# Patient Record
Sex: FEMALE | Race: WHITE | NOT HISPANIC OR LATINO | Employment: OTHER | URBAN - METROPOLITAN AREA
[De-identification: names, ages, dates, MRNs, and addresses within clinical notes are randomized per-mention and may not be internally consistent; named-entity substitution may affect disease eponyms.]

---

## 2017-09-05 ENCOUNTER — HOSPITAL ENCOUNTER (OUTPATIENT)
Dept: ULTRASOUND IMAGING | Facility: CLINIC | Age: 62
Discharge: HOME | End: 2017-09-05
Payer: COMMERCIAL

## 2017-09-05 DIAGNOSIS — E04.2 NONTOXIC MULTINODULAR GOITER: ICD-10-CM

## 2017-09-05 PROCEDURE — 76536 US EXAM OF HEAD AND NECK: CPT

## 2017-10-03 ENCOUNTER — TRANSCRIBE ORDERS (OUTPATIENT)
Dept: ADMINISTRATIVE | Facility: HOSPITAL | Age: 62
End: 2017-10-03

## 2017-10-03 DIAGNOSIS — R31.0 GROSS HEMATURIA: Primary | ICD-10-CM

## 2017-10-09 ENCOUNTER — HOSPITAL ENCOUNTER (OUTPATIENT)
Dept: CT IMAGING | Facility: CLINIC | Age: 62
Discharge: HOME | End: 2017-10-09
Payer: COMMERCIAL

## 2017-10-09 DIAGNOSIS — R31.0 GROSS HEMATURIA: ICD-10-CM

## 2017-10-09 PROCEDURE — 74178 CT ABD&PLV WO CNTR FLWD CNTR: CPT

## 2017-10-09 RX ADMIN — IOHEXOL 100 ML: 350 INJECTION, SOLUTION INTRAVENOUS at 10:30

## 2018-01-15 NOTE — RESULT NOTES
PFT Results v2:   Diagnosis/Reason For Study: Asthma   Referring Provider: Dr Raymon Crowell   Spirometry: Forced vital capacity: 3 19L and 101% Predicted Values  Forced expiratory volume in one second: 2 29L and 94% Predicted Value  FEV1/FVC ratio is 92% Predicted Values  F EF 25-75 percent, 1 56 L, 70% predicted    Post Bronchodilator Spirometry: Forced vital capacity : 3 26L and 103% Predicted Values  Forced expiratory volume in one second : 2 36L and 97% Predicted Value  FEV1/FVC ratio is 92% Predicted Values  FEF 25-75 percent, 1 76 L, 79% predicted   Lung Volumes: Total lung capacity : 5 86L and 118% Predicted Values  RV: 134% Predicted Values  RV/T% Predicted Values  DLCO:   DLCO 108% Predicted Values  PFT Interpretation:   Patient had a full lung function testing with spirometry lung volumes and DLCO  Patient gave a good effort  Results meet the ATS standards for acceptability and repeatability  The flow volume curve is normal   There is mild small airway obstructive ventilatory limitation with an appreciable response to the bronco dilator  The lung volumes and DLCO are normal   Findings are consistent with asthma  Clinical correlation is required  Future Appointments    Date/Time Provider Specialty Site   2016 10:45 AM SEAN Potter   Pulmonary Medicine [place] [address] [place]      Electronically signed by : SEAN Mchugh ; 2016  5:11PM EST                       (Author)

## 2018-02-27 ENCOUNTER — TRANSCRIBE ORDERS (OUTPATIENT)
Dept: ADMINISTRATIVE | Facility: HOSPITAL | Age: 63
End: 2018-02-27

## 2018-02-27 DIAGNOSIS — E04.1 THYROID NODULE: Primary | ICD-10-CM

## 2018-03-13 ENCOUNTER — HOSPITAL ENCOUNTER (OUTPATIENT)
Dept: ULTRASOUND IMAGING | Facility: CLINIC | Age: 63
Discharge: HOME | End: 2018-03-13
Payer: COMMERCIAL

## 2018-03-13 DIAGNOSIS — E04.1 THYROID NODULE: ICD-10-CM

## 2018-03-13 PROCEDURE — 76536 US EXAM OF HEAD AND NECK: CPT

## 2019-02-04 ENCOUNTER — TRANSCRIBE ORDERS (OUTPATIENT)
Dept: ADMINISTRATIVE | Facility: HOSPITAL | Age: 64
End: 2019-02-04

## 2019-02-04 DIAGNOSIS — M54.40 ACUTE RIGHT-SIDED LOW BACK PAIN WITH SCIATICA, SCIATICA LATERALITY UNSPECIFIED: Primary | ICD-10-CM

## 2019-02-05 ENCOUNTER — HOSPITAL ENCOUNTER (OUTPATIENT)
Dept: ULTRASOUND IMAGING | Facility: CLINIC | Age: 64
Discharge: HOME | End: 2019-02-05
Payer: MEDICARE

## 2019-02-05 DIAGNOSIS — M54.40 ACUTE RIGHT-SIDED LOW BACK PAIN WITH SCIATICA, SCIATICA LATERALITY UNSPECIFIED: ICD-10-CM

## 2019-02-05 PROCEDURE — 76856 US EXAM PELVIC COMPLETE: CPT

## 2019-02-05 PROCEDURE — 76830 TRANSVAGINAL US NON-OB: CPT

## 2019-04-24 ENCOUNTER — TRANSCRIBE ORDERS (OUTPATIENT)
Dept: ADMINISTRATIVE | Facility: HOSPITAL | Age: 64
End: 2019-04-24

## 2019-04-24 DIAGNOSIS — M81.0 OSTEOPOROSIS, POST-MENOPAUSAL: Primary | ICD-10-CM

## 2019-04-30 ENCOUNTER — HOSPITAL ENCOUNTER (OUTPATIENT)
Dept: MAMMOGRAPHY | Facility: CLINIC | Age: 64
Discharge: HOME | End: 2019-04-30
Payer: MEDICARE

## 2019-04-30 DIAGNOSIS — M81.0 OSTEOPOROSIS, POST-MENOPAUSAL: ICD-10-CM

## 2019-04-30 PROCEDURE — 77080 DXA BONE DENSITY AXIAL: CPT

## 2019-05-24 ENCOUNTER — TRANSCRIBE ORDERS (OUTPATIENT)
Dept: ADMINISTRATIVE | Facility: HOSPITAL | Age: 64
End: 2019-05-24

## 2019-05-24 DIAGNOSIS — R93.5 ABNORMAL US (ULTRASOUND) OF ABDOMEN: Primary | ICD-10-CM

## 2019-05-30 ENCOUNTER — HOSPITAL ENCOUNTER (OUTPATIENT)
Dept: ULTRASOUND IMAGING | Facility: CLINIC | Age: 64
Discharge: HOME | End: 2019-05-30
Payer: MEDICARE

## 2019-05-30 DIAGNOSIS — R93.5 ABNORMAL US (ULTRASOUND) OF ABDOMEN: ICD-10-CM

## 2019-05-30 PROCEDURE — 76856 US EXAM PELVIC COMPLETE: CPT

## 2019-05-30 PROCEDURE — 76830 TRANSVAGINAL US NON-OB: CPT

## 2019-09-12 ENCOUNTER — TRANSCRIBE ORDERS (OUTPATIENT)
Dept: ADMINISTRATIVE | Facility: HOSPITAL | Age: 64
End: 2019-09-12

## 2019-09-12 DIAGNOSIS — E04.2 MULTIPLE THYROID NODULES: Primary | ICD-10-CM

## 2019-09-24 ENCOUNTER — HOSPITAL ENCOUNTER (OUTPATIENT)
Dept: ULTRASOUND IMAGING | Facility: CLINIC | Age: 64
Discharge: HOME | End: 2019-09-24
Payer: MEDICARE

## 2019-09-24 DIAGNOSIS — E04.2 MULTIPLE THYROID NODULES: ICD-10-CM

## 2019-09-24 PROCEDURE — 76536 US EXAM OF HEAD AND NECK: CPT

## 2021-03-22 ENCOUNTER — TRANSCRIBE ORDERS (OUTPATIENT)
Dept: ADMINISTRATIVE | Facility: HOSPITAL | Age: 66
End: 2021-03-22

## 2021-03-22 DIAGNOSIS — R06.02 SHORTNESS OF BREATH: Primary | ICD-10-CM

## 2021-03-23 ENCOUNTER — APPOINTMENT (OUTPATIENT)
Dept: RADIOLOGY | Facility: CLINIC | Age: 66
End: 2021-03-23
Payer: MEDICARE

## 2021-03-23 DIAGNOSIS — R06.02 SHORTNESS OF BREATH: ICD-10-CM

## 2021-03-23 PROCEDURE — 71046 X-RAY EXAM CHEST 2 VIEWS: CPT

## 2021-04-08 ENCOUNTER — TRANSCRIBE ORDERS (OUTPATIENT)
Dept: ADMINISTRATIVE | Facility: HOSPITAL | Age: 66
End: 2021-04-08

## 2021-04-08 DIAGNOSIS — J45.41 MODERATE PERSISTENT ASTHMA WITH (ACUTE) EXACERBATION: Primary | ICD-10-CM

## 2021-05-10 ENCOUNTER — HOSPITAL ENCOUNTER (OUTPATIENT)
Dept: PULMONOLOGY | Facility: HOSPITAL | Age: 66
Discharge: HOME | End: 2021-05-10
Payer: MEDICARE

## 2021-05-10 DIAGNOSIS — J45.41 MODERATE PERSISTENT ASTHMA WITH (ACUTE) EXACERBATION: ICD-10-CM

## 2021-05-10 PROCEDURE — 94060 EVALUATION OF WHEEZING: CPT

## 2021-05-10 PROCEDURE — 94760 N-INVAS EAR/PLS OXIMETRY 1: CPT

## 2021-05-10 PROCEDURE — 94060 EVALUATION OF WHEEZING: CPT | Performed by: INTERNAL MEDICINE

## 2021-05-10 RX ORDER — ALBUTEROL SULFATE 2.5 MG/3ML
2.5 SOLUTION RESPIRATORY (INHALATION) ONCE
Status: COMPLETED | OUTPATIENT
Start: 2021-05-10 | End: 2021-05-10

## 2021-05-10 RX ADMIN — ALBUTEROL SULFATE 2.5 MG: 2.5 SOLUTION RESPIRATORY (INHALATION) at 12:17

## 2021-10-20 ENCOUNTER — HOSPITAL ENCOUNTER (OUTPATIENT)
Dept: ULTRASOUND IMAGING | Facility: CLINIC | Age: 66
Discharge: HOME | End: 2021-10-20
Payer: MEDICARE

## 2021-10-20 DIAGNOSIS — E04.2 NONTOXIC MULTINODULAR GOITER: ICD-10-CM

## 2021-10-20 PROCEDURE — 76536 US EXAM OF HEAD AND NECK: CPT

## 2022-01-06 ENCOUNTER — OFFICE VISIT (OUTPATIENT)
Dept: CARDIOLOGY CLINIC | Facility: CLINIC | Age: 67
End: 2022-01-06
Payer: MEDICARE

## 2022-01-06 VITALS
OXYGEN SATURATION: 97 % | BODY MASS INDEX: 25.52 KG/M2 | HEIGHT: 63 IN | SYSTOLIC BLOOD PRESSURE: 130 MMHG | WEIGHT: 144 LBS | RESPIRATION RATE: 16 BRPM | HEART RATE: 83 BPM | DIASTOLIC BLOOD PRESSURE: 70 MMHG

## 2022-01-06 DIAGNOSIS — R00.2 INTERMITTENT PALPITATIONS: ICD-10-CM

## 2022-01-06 DIAGNOSIS — I10 ESSENTIAL HYPERTENSION: ICD-10-CM

## 2022-01-06 DIAGNOSIS — R06.02 SHORTNESS OF BREATH ON EXERTION: ICD-10-CM

## 2022-01-06 DIAGNOSIS — I51.7 LVH (LEFT VENTRICULAR HYPERTROPHY): ICD-10-CM

## 2022-01-06 DIAGNOSIS — E78.2 MIXED HYPERLIPIDEMIA: ICD-10-CM

## 2022-01-06 DIAGNOSIS — R07.9 CHEST PAIN, UNSPECIFIED TYPE: Primary | ICD-10-CM

## 2022-01-06 DIAGNOSIS — I51.89 DIASTOLIC DYSFUNCTION: ICD-10-CM

## 2022-01-06 PROCEDURE — 93000 ELECTROCARDIOGRAM COMPLETE: CPT | Performed by: INTERNAL MEDICINE

## 2022-01-06 PROCEDURE — 99204 OFFICE O/P NEW MOD 45 MIN: CPT | Performed by: INTERNAL MEDICINE

## 2022-01-06 RX ORDER — ATORVASTATIN CALCIUM 10 MG/1
TABLET, FILM COATED ORAL
COMMUNITY
Start: 2021-10-30

## 2022-01-06 RX ORDER — LISINOPRIL 10 MG/1
TABLET ORAL
COMMUNITY
End: 2022-02-17 | Stop reason: SDUPTHER

## 2022-01-06 RX ORDER — TIOTROPIUM BROMIDE INHALATION SPRAY 1.56 UG/1
SPRAY, METERED RESPIRATORY (INHALATION)
COMMUNITY
Start: 2021-12-21

## 2022-01-06 RX ORDER — FEXOFENADINE HCL 180 MG/1
180 TABLET ORAL DAILY
COMMUNITY

## 2022-01-06 RX ORDER — SUMATRIPTAN 50 MG/1
TABLET, FILM COATED ORAL
COMMUNITY

## 2022-01-06 RX ORDER — ALBUTEROL SULFATE 90 UG/1
2 AEROSOL, METERED RESPIRATORY (INHALATION) EVERY 4 HOURS PRN
COMMUNITY
Start: 2021-05-18 | End: 2022-05-18

## 2022-01-06 RX ORDER — PAROXETINE HYDROCHLORIDE 20 MG/1
TABLET, FILM COATED ORAL
COMMUNITY

## 2022-01-06 NOTE — PROGRESS NOTES
[facility]  [address], [address] [place] [address], [facility] Yessica Gomez  Tel: [phone]      NAME: Ursula Tellez  AGE: 78 y o  SEX: female  : 1955   MRN: [id number]      Chief Complaint:  Chief Complaint   Patient presents with   Ardeth Needs Establish Care     consult         History of Present Illness:   48-year-old female with HTN, HLP, asthma/chronic bronchitis who is referred for palpitations, chest pain, shortness of breath  Patient states she has been having palpitations since 2021  Her heart randomly starts going fast usually in the low 100s,  Unrelated to whether she is resting or doing something  No associated chest pain, lightheadedness, shortness of breath  Patient has also been having episodes of mid chest pain at random times  Mild-to-moderate in intensity  No radiation  No significant associated symptoms  She also feels SOB with activity  She does see a pulmonologist for her asthma/chronic bronchitis    Pt denies lightheadedness, syncope, swelling feet, orthopnea, PND, claudication  HTN, LVH, DD -  Has been hypertensive for many years  Taking medications regularly  Denies lightheadedness, headache, medication side effects  HLP -  Has had hyperlipidemia for many years  Taking statin regularly along with diet control  Denies myalgia  PCP closely monitoring the blood work      Past Medical History:  Past Medical History:   Diagnosis Date    Asthma        Family History:  Family History   Problem Relation Age of Onset    Hypertension Mother          Social History:  Social History     Socioeconomic History    Marital status: /Civil Union     Spouse name: None    Number of children: None    Years of education: None    Highest education level: None   Occupational History    None   Tobacco Use    Smoking status: Never Smoker    Smokeless tobacco: Never Used   Vaping Use    Vaping Use: Never used   Substance and Sexual Activity    Alcohol use: Never    Drug use: Never    Sexual activity: None   Other Topics Concern    None   Social History Narrative    None     Social Determinants of Health     Financial Resource Strain: Not on file   Food Insecurity: Not on file   Transportation Needs: Not on file   Physical Activity: Not on file   Stress: Not on file   Social Connections: Not on file   Intimate Partner Violence: Not on file   Housing Stability: Not on file         Active Problems:  Patient Active Problem List   Diagnosis    Moderate persistent asthma with (acute) exacerbation         The following portions of the patient's history were reviewed and updated as appropriate: past medical history, past surgical history, past family history,  past social history, current medications, allergies and problem list       Review of Systems:  Ten system review of systems was essentially negative other than what is mentioned in the HPI above    Vitals:  Vitals:    01/06/22 0911   BP: 130/70   Pulse: 83   Resp: 16   SpO2: 97%       Body mass index is 25 51 kg/m²  Weight (last 2 days)     Date/Time Weight    01/06/22 0911 65 3 (144)            Physical Examination:  General: Patient is not in acute distress  Awake, alert, oriented in time, place and person  Responding to commands  Head: Normocephalic  Atraumatic  Eyes: Both pupils normal sized, round and reactive to light  Nonicteric  ENT: Normal external ear canals  Neck: Supple  JVP not raised  Trachea central  No thyromegaly  Lungs: Bilateral bronchovascular breath sounds with no crackles or rhonchi  Chest wall: No tenderness  Cardiovascular: RRR  S1 and S2 normal  No murmur, rub or gallop  Gastrointestinal: Abdomen soft, nontender  No guarding or rigidity  Liver and spleen not palpable  Bowel sounds present  Neurologic: Patient is awake, alert, oriented in time, place and person  Responding to commands   Moving all extremities  Integumentary:  No skin rash  Lymphatic: No cervical lymphadenopathy  Back: Symmetric  No CVA tenderness  Extremities: No clubbing, cyanosis or edema      Laboratory Results:    EKG:  Reviewed by me   01/06/2022  Normal sinus rhythm  LAD      Medications:    Current Outpatient Medications:     albuterol (Ventolin HFA) 90 mcg/act inhaler, Inhale 2 puffs every 4 (four) hours as needed, Disp: , Rfl:     atorvastatin (LIPITOR) 10 mg tablet, , Disp: , Rfl:     Cholecalciferol 25 MCG (1000 UT) tablet, Take 1,000 Units by mouth daily, Disp: , Rfl:     fexofenadine (ALLEGRA) 180 MG tablet, Take 180 mg by mouth daily, Disp: , Rfl:     fluticasone-vilanterol (Breo Ellipta) 200-25 MCG/INH inhaler, Breo Ellipta 200 mcg-25 mcg/dose powder for inhalation, Disp: , Rfl:     lisinopril (ZESTRIL) 10 mg tablet, lisinopril 10 mg tablet, Disp: , Rfl:     Multiple Vitamins-Minerals (CENTRUM SILVER 50+MEN PO), Take 1 tablet by mouth daily, Disp: , Rfl:     PARoxetine (PAXIL) 20 mg tablet, paroxetine 20 mg tablet, Disp: , Rfl:     Spiriva Respimat 1 25 MCG/ACT AERS inhaler, , Disp: , Rfl:     SUMAtriptan (IMITREX) 50 mg tablet, sumatriptan 50 mg tablet, Disp: , Rfl:       Allergies: Allergies   Allergen Reactions    Albumen, Egg - Food Allergy Shortness Of Breath    Penicillins Rash     Other reaction(s): Asthma      Tetanus Antitoxin Anaphylaxis         Assessment and Plan:  1  Chest pain, unspecified type  2  Shortness of breath on exertion  Patient had a regular exercise stress test which showed a 0 5 mm upsloping ST depression  She could only exercise for approx 2 1/2 min  Ordering pharmacological nuclear stress test for further evaluation  - NM myocardial perfusion spect (rx stress and/or rest); Future    3  Intermittent palpitations  EKG done to the reviewed with the patient  Event monitor ordered for further evaluation    Patient states she does not drink coffee, tea, soda, alcohol  - AMB extended holter monitor; Future    4  Essential hypertension, LVH, Diastolic dysfunction  BP stable  Continue current medications  Continue to monitor BP at home and call if abnormal    5  Mixed hyperlipidemia  Continue statin and diet control  Her PCP closely monitor the blood work    Recommend aggressive risk factor modification and therapeutic lifestyle changes  Low-salt, low-calorie, low-fat, low-cholesterol diet with regular exercise and to optimize weight  I will defer the ordering and monitoring of necessity lab studies to you, but I am available and happy to review and manage any of the data at your request in the future  Discussed concepts of atherosclerosis, including signs and symptoms of cardiac disease  Previous studies were reviewed  Safety measures were reviewed  Questions were entertained and answered  Patient was advised to report any problems requiring medical attention  Follow-up with PCP and appropriate specialist and lab work as discussed  Return for follow up visit as scheduled or earlier, if needed  Thank you for allowing me to participate in the care and evaluation of your patient  Should you have any questions, please feel free to contact me        Lily Logan MD  1/6/2022,9:52 AM

## 2022-01-24 ENCOUNTER — HOSPITAL ENCOUNTER (OUTPATIENT)
Dept: NON INVASIVE DIAGNOSTICS | Facility: CLINIC | Age: 67
Discharge: HOME | End: 2022-01-24
Payer: MEDICARE

## 2022-01-24 VITALS
SYSTOLIC BLOOD PRESSURE: 144 MMHG | DIASTOLIC BLOOD PRESSURE: 102 MMHG | OXYGEN SATURATION: 100 % | HEART RATE: 69 BPM | BODY MASS INDEX: 25.52 KG/M2 | WEIGHT: 144 LBS | HEIGHT: 63 IN

## 2022-01-24 DIAGNOSIS — R06.02 SHORTNESS OF BREATH ON EXERTION: ICD-10-CM

## 2022-01-24 DIAGNOSIS — R07.9 CHEST PAIN, UNSPECIFIED TYPE: ICD-10-CM

## 2022-01-24 LAB
NUC STRESS DIASTOLIC VOLUME INDEX: 36 ML/M2
NUC STRESS EJECTION FRACTION: 65 %
NUC STRESS SYSTOLIC VOLUME INDEX: 13 ML/M2
RATE PRESSURE PRODUCT: NORMAL
SL CV REST NUCLEAR ISOTOPE DOSE: 10.62 MCI
SL CV STRESS NUCLEAR ISOTOPE DOSE: 32.3 MCI
SL CV STRESS RECOVERY BP: NORMAL MMHG
SL CV STRESS RECOVERY HR: 78 BPM
STRESS ANGINA INDEX: 0
STRESS BASELINE BP: NORMAL MMHG
STRESS BASELINE HR: 69 BPM
STRESS O2 SAT REST: 100 %
STRESS PEAK HR: 97 BPM
STRESS POST O2 SAT PEAK: 100 %
STRESS POST PEAK BP: 158 MMHG
STRESS/REST PERFUSION RATIO: 1.07

## 2022-01-24 PROCEDURE — 78452 HT MUSCLE IMAGE SPECT MULT: CPT | Performed by: INTERNAL MEDICINE

## 2022-01-24 PROCEDURE — G1004 CDSM NDSC: HCPCS

## 2022-01-24 PROCEDURE — 93017 CV STRESS TEST TRACING ONLY: CPT

## 2022-01-24 PROCEDURE — 93016 CV STRESS TEST SUPVJ ONLY: CPT | Performed by: INTERNAL MEDICINE

## 2022-01-24 PROCEDURE — A9502 TC99M TETROFOSMIN: HCPCS

## 2022-01-24 PROCEDURE — 78452 HT MUSCLE IMAGE SPECT MULT: CPT

## 2022-01-24 PROCEDURE — 93018 CV STRESS TEST I&R ONLY: CPT | Performed by: INTERNAL MEDICINE

## 2022-01-24 RX ADMIN — REGADENOSON 0.4 MG: 0.08 INJECTION, SOLUTION INTRAVENOUS at 13:02

## 2022-01-25 ENCOUNTER — CLINICAL SUPPORT (OUTPATIENT)
Dept: CARDIOLOGY CLINIC | Facility: CLINIC | Age: 67
End: 2022-01-25
Payer: MEDICARE

## 2022-01-25 DIAGNOSIS — R00.2 INTERMITTENT PALPITATIONS: ICD-10-CM

## 2022-01-25 PROCEDURE — 93248 EXT ECG>7D<15D REV&INTERPJ: CPT | Performed by: INTERNAL MEDICINE

## 2022-01-28 LAB
MAX DIASTOLIC BP: 94 MMHG
MAX HEART RATE: 97 BPM
MAX PREDICTED HEART RATE: 153 BPM
MAX. SYSTOLIC BP: 158 MMHG
PROTOCOL NAME: NORMAL
REASON FOR TERMINATION: NORMAL
TARGET HR FORMULA: NORMAL
TIME IN EXERCISE PHASE: NORMAL

## 2022-02-17 ENCOUNTER — OFFICE VISIT (OUTPATIENT)
Dept: CARDIOLOGY CLINIC | Facility: CLINIC | Age: 67
End: 2022-02-17
Payer: MEDICARE

## 2022-02-17 VITALS
DIASTOLIC BLOOD PRESSURE: 98 MMHG | RESPIRATION RATE: 16 BRPM | WEIGHT: 140 LBS | HEIGHT: 63 IN | BODY MASS INDEX: 24.8 KG/M2 | SYSTOLIC BLOOD PRESSURE: 142 MMHG | OXYGEN SATURATION: 97 % | HEART RATE: 81 BPM

## 2022-02-17 DIAGNOSIS — I10 ESSENTIAL HYPERTENSION: ICD-10-CM

## 2022-02-17 DIAGNOSIS — I51.7 LVH (LEFT VENTRICULAR HYPERTROPHY): ICD-10-CM

## 2022-02-17 DIAGNOSIS — I51.89 DIASTOLIC DYSFUNCTION: ICD-10-CM

## 2022-02-17 DIAGNOSIS — E78.2 MIXED HYPERLIPIDEMIA: ICD-10-CM

## 2022-02-17 DIAGNOSIS — R00.2 INTERMITTENT PALPITATIONS: Primary | ICD-10-CM

## 2022-02-17 PROCEDURE — 99214 OFFICE O/P EST MOD 30 MIN: CPT | Performed by: INTERNAL MEDICINE

## 2022-02-17 RX ORDER — METOPROLOL SUCCINATE 25 MG/1
25 TABLET, EXTENDED RELEASE ORAL DAILY
Qty: 90 TABLET | Refills: 2 | Status: SHIPPED | OUTPATIENT
Start: 2022-02-17 | End: 2022-08-08 | Stop reason: ALTCHOICE

## 2022-02-17 RX ORDER — LISINOPRIL 5 MG/1
5 TABLET ORAL DAILY
Qty: 90 TABLET | Refills: 3 | Status: SHIPPED | OUTPATIENT
Start: 2022-02-17 | End: 2022-08-08 | Stop reason: SDUPTHER

## 2022-02-17 NOTE — PROGRESS NOTES
CARDIOLOGY OFFICE VISIT  [facility]  [address] [facility], [address] [facility], [facility] Yessica Gomez  Tel: [phone]      NAME: Felton Room  AGE: 78 y o  SEX: female  : 1955   MRN: [id number]      Chief Complaint:  Chief Complaint   Patient presents with    Follow-up         History of Present Illness:   Patient comes for follow up  States says she still gets occasional palpitations - sometimes while she is working like vacuuming or cooking and sometimes while she is doing nothing  Pt denies chest pain / pressure, SOB, lightheadedness, syncope, swelling feet, orthopnea, PND, claudication  Patient states she has been having palpitations since 2021  Her heart randomly starts going fast usually in the low 100s,  Unrelated to whether she is resting or doing something  No associated chest pain, lightheadedness, shortness of breath  HTN, LVH, DD -  Has been hypertensive for many years  Taking medications regularly  Denies lightheadedness, headache, medication side effects  HLP -  Has had hyperlipidemia for many years  Taking statin regularly along with diet control  Denies myalgia  PCP closely monitoring the blood work      Past Medical History:  Past Medical History:   Diagnosis Date    Asthma          Family History:  Family History   Problem Relation Age of Onset    Hypertension Mother          Social History:  Social History     Socioeconomic History    Marital status: /Civil Union     Spouse name: None    Number of children: None    Years of education: None    Highest education level: None   Occupational History    None   Tobacco Use    Smoking status: Never Smoker    Smokeless tobacco: Never Used   Vaping Use    Vaping Use: Never used   Substance and Sexual Activity    Alcohol use: Never    Drug use: Never    Sexual activity: None   Other Topics Concern    None   Social History Narrative    None Social Determinants of Health     Financial Resource Strain: Not on file   Food Insecurity: Not on file   Transportation Needs: Not on file   Physical Activity: Not on file   Stress: Not on file   Social Connections: Not on file   Intimate Partner Violence: Not on file   Housing Stability: Not on file         Active Problems:  Patient Active Problem List   Diagnosis    Moderate persistent asthma with (acute) exacerbation         The following portions of the patient's history were reviewed and updated as appropriate: past medical history, past surgical history, past family history,  past social history, current medications, allergies and problem list       Review of Systems:  Constitutional: Denies fever, chills  Eyes: Denies eye redness, eye discharge  ENT: Denies hearing loss, sneezing, nasal discharge, sore throat   Respiratory: Denies cough, expectoration, shortness of breath  Cardiovascular: Denies lower extremity swelling  Gastrointestinal: Denies abdominal pain, nausea, vomiting, diarrhea  Genito-Urinary: Denies dysuria, incontinence  Musculoskeletal: Denies back pain, joint pain, muscle pain  Neurologic: Denies lightheadedness, syncope, headache, seizures  Endocrine: Denies polydipsia, temperature intolerance  Allergy and Immunology: Denies hives, insect bite sensitivity  Hematological and Lymphatic: Denies bleeding problems, swollen glands   Psychological: Denies depression, suicidal ideation, anxiety, panic  Dermatological: Denies pruritus, rash, skin lesion changes      Vitals:  Vitals:    02/17/22 0949   BP: 142/98   Pulse: 81   Resp: 16   SpO2: 97%       Body mass index is 24 8 kg/m²  Weight (last 2 days)     Date/Time Weight    02/17/22 0949 63 5 (140)            Physical Examination:  General: Patient is not in acute distress  Awake, alert, oriented in time, place and person  Responding to commands  Head: Normocephalic  Atraumatic  Eyes: Both pupils normal sized, round and reactive to light  Nonicteric  ENT: Normal external ear canals  Neck: Supple  JVP not raised  Trachea central  No thyromegaly  Lungs: Bilateral bronchovascular breath sounds with no crackles or rhonchi  Chest wall: No tenderness  Cardiovascular: RRR  S1 and S2 normal  No murmur, rub or gallop  Gastrointestinal: Abdomen soft, nontender  No guarding or rigidity  Liver and spleen not palpable  Bowel sounds present  Neurologic: Patient is awake, alert, oriented in time, place and person  Responding to commands  Moving all extremities  Integumentary:  No skin rash  Lymphatic: No cervical lymphadenopathy  Back: Symmetric  No CVA tenderness  Extremities: No clubbing, cyanosis or edema      Medications:    Current Outpatient Medications:     albuterol (Ventolin HFA) 90 mcg/act inhaler, Inhale 2 puffs every 4 (four) hours as needed, Disp: , Rfl:     atorvastatin (LIPITOR) 10 mg tablet, , Disp: , Rfl:     Cholecalciferol 25 MCG (1000 UT) tablet, Take 1,000 Units by mouth daily, Disp: , Rfl:     fexofenadine (ALLEGRA) 180 MG tablet, Take 180 mg by mouth daily, Disp: , Rfl:     fluticasone-vilanterol (Breo Ellipta) 200-25 MCG/INH inhaler, Breo Ellipta 200 mcg-25 mcg/dose powder for inhalation, Disp: , Rfl:     lisinopril (ZESTRIL) 5 mg tablet, Take 1 tablet (5 mg total) by mouth daily, Disp: 90 tablet, Rfl: 3    Multiple Vitamins-Minerals (CENTRUM SILVER 50+MEN PO), Take 1 tablet by mouth daily, Disp: , Rfl:     PARoxetine (PAXIL) 20 mg tablet, paroxetine 20 mg tablet, Disp: , Rfl:     Spiriva Respimat 1 25 MCG/ACT AERS inhaler, , Disp: , Rfl:     SUMAtriptan (IMITREX) 50 mg tablet, sumatriptan 50 mg tablet, Disp: , Rfl:     metoprolol succinate (TOPROL-XL) 25 mg 24 hr tablet, Take 1 tablet (25 mg total) by mouth daily, Disp: 90 tablet, Rfl: 2      Allergies:   Allergies   Allergen Reactions    Albumen, Egg - Food Allergy Shortness Of Breath    Penicillins Rash     Other reaction(s): Asthma      Tetanus Antitoxin Anaphylaxis Assessment and Plan:  1  Intermittent palpitations  Stress test and event monitor findings discussed with the patient in detail  Patient started on metoprolol succinate 25 mg daily  States she does not drink coffee, tea, soda, alcohol, stimulants  - metoprolol succinate (TOPROL-XL) 25 mg 24 hr tablet; Take 1 tablet (25 mg total) by mouth daily  Dispense: 90 tablet; Refill: 2    2  Essential hypertension, LVH, DD  Lisinopril decreased from 10 mg to 5 mg daily  Metoprolol succinate 25 mg daily added  - lisinopril (ZESTRIL) 5 mg tablet; Take 1 tablet (5 mg total) by mouth daily  Dispense: 90 tablet; Refill: 3  - metoprolol succinate (TOPROL-XL) 25 mg 24 hr tablet; Take 1 tablet (25 mg total) by mouth daily  Dispense: 90 tablet; Refill: 2    3  Mixed hyperlipidemia  Continue statin and diet control  Her PCP closely monitor the blood work    Recommend aggressive risk factor modification and therapeutic lifestyle changes  Low-salt, low-calorie, low-fat, low-cholesterol diet with regular exercise and to optimize weight  I will defer the ordering and monitoring of necessity lab studies to you, but I am available and happy to review and manage any of the data at your request in the future  Discussed concepts of atherosclerosis, including signs and symptoms of cardiac disease  Previous studies were reviewed  Safety measures were reviewed  Questions were entertained and answered  Patient was advised to report any problems requiring medical attention  Follow-up with PCP and appropriate specialist and lab work as discussed  Return for follow up visit as scheduled or earlier, if needed  Thank you for allowing me to participate in the care and evaluation of your patient  Should you have any questions, please feel free to contact me        Herb Cherry MD  2/17/2022,10:19 AM

## 2022-03-21 ENCOUNTER — TELEPHONE (OUTPATIENT)
Dept: CARDIOLOGY CLINIC | Facility: CLINIC | Age: 67
End: 2022-03-21

## 2022-03-21 NOTE — TELEPHONE ENCOUNTER
----- Message from [facility] sent at 3/20/2022 12:56 PM EDT -----  Regarding: Metoprolol medicine intolerance  Dear Dr Emerita King,   Please note that I have problems taking Metoprolol medicine  When I started taking a 25 mg  tablet, I got light asthma attack  After waiting for 3 days, I started taking 1/3 of a 25 mg  tablet  While my pulse rate diminished almost to normal (77), I have had shortness of breath, frequent cough and wheezing in my chest  It seems that this medicine is not much compatible with my bronchial asthma and chronic bronchitis  Is it possible to prescribe another medicine that while reducing my high heartbeat and pulse rate could not aggravate my asthma and bronchitis? Thanks for your urgent attention to this matter  Best Regards   [facility]

## 2022-08-08 ENCOUNTER — OFFICE VISIT (OUTPATIENT)
Dept: CARDIOLOGY CLINIC | Facility: CLINIC | Age: 67
End: 2022-08-08
Payer: MEDICARE

## 2022-08-08 VITALS
RESPIRATION RATE: 16 BRPM | HEART RATE: 84 BPM | SYSTOLIC BLOOD PRESSURE: 148 MMHG | DIASTOLIC BLOOD PRESSURE: 80 MMHG | BODY MASS INDEX: 24.27 KG/M2 | WEIGHT: 137 LBS | HEIGHT: 63 IN | OXYGEN SATURATION: 99 %

## 2022-08-08 DIAGNOSIS — E78.2 MIXED HYPERLIPIDEMIA: ICD-10-CM

## 2022-08-08 DIAGNOSIS — R00.2 INTERMITTENT PALPITATIONS: Primary | ICD-10-CM

## 2022-08-08 DIAGNOSIS — I10 ESSENTIAL HYPERTENSION: ICD-10-CM

## 2022-08-08 DIAGNOSIS — I51.7 LVH (LEFT VENTRICULAR HYPERTROPHY): ICD-10-CM

## 2022-08-08 DIAGNOSIS — I51.89 DIASTOLIC DYSFUNCTION: ICD-10-CM

## 2022-08-08 PROCEDURE — 99214 OFFICE O/P EST MOD 30 MIN: CPT | Performed by: INTERNAL MEDICINE

## 2022-08-08 RX ORDER — LISINOPRIL 10 MG/1
15 TABLET ORAL DAILY
Qty: 135 TABLET | Refills: 2 | Status: SHIPPED | OUTPATIENT
Start: 2022-08-08 | End: 2022-08-08

## 2022-08-08 RX ORDER — LISINOPRIL 10 MG/1
TABLET ORAL
Qty: 135 TABLET | Refills: 2 | Status: SHIPPED | OUTPATIENT
Start: 2022-08-08 | End: 2022-08-09

## 2022-08-08 NOTE — TELEPHONE ENCOUNTER
Name from pharmacy: LISINOPRIL 10 MG TABLET          Will file in chart as: lisinopril (ZESTRIL) 10 mg tablet    Sig: TAKE 1 AND 1/2 TABLETS DAILY BY MOUTH    Disp:  135 tablet    Refills:  2    Start: 8/8/2022    Class: Normal    For: Essential hypertension    Last ordered: Today by Mayte Dean MD Last refill: 8/8/2022    Rx #: [id number]    Pharmacy comment: Alternative Requested:PT'S INSURANCE WILL ONLY PAY FOR 1 TAB DAILY  Powered by [employer] Cwzuvqmk21/08/2022 11:51 AM  This is a duplicate request of medication ordered 2022-08-08  Check to see if the patient is requesting a refill from a new pharmacy  Cardiovascular:  ACE Inhibitors Failed 08/08/2022 11:51 AM   Protocol Details  Cr in normal range and within 360 days    K in normal range and within 360 days    BP completed in the last 6 months    Valid encounter within last 6 months    Pregnancy status within 30 days      This request has changes from the previous prescription  To be filled at: [id number] [place]ASTA GARCIA - [address]   Please review and refill if possible  Thanks!

## 2022-08-08 NOTE — PROGRESS NOTES
CARDIOLOGY OFFICE VISIT  [facility]  [address], [facility] Yessica Gomez  Tel: [phone]      NAME: Kj Mckeon  AGE: 75 y o  SEX: female  : 1955  MRN: [id number]      Chief Complaint:  Chief Complaint   Patient presents with    Follow-up         History of Present Illness:   Patient comes for follow up  States she is doing well from cardiac stand point and denies chest pain / pressure, SOB, lightheadedness, syncope, swelling feet, orthopnea, PND, claudication  Patient continues to get palpitations in the form of extra/missed beats  Infrequently  She did not tolerate metoprolol as it worsened her asthma  So she discontinued it  Denies associated chest pain, lightheaded, shortness of    Essential hypertension, LVH, DD -  Has been hypertensive for many years  Taking medications regularly  Denies lightheadedness, headache, medication side effects  Mixed hyperlipidemia -  Has had hyperlipidemia for many years  Taking statin regularly along with diet control  Denies myalgia  PCP closely monitoring the blood work  Past Medical History:  Past Medical History:   Diagnosis Date    Asthma          Past Surgical History:  History reviewed  No pertinent surgical history        Family History:  Family History   Problem Relation Age of Onset    Hypertension Mother          Social History:  Social History     Socioeconomic History    Marital status: /Civil Union     Spouse name: None    Number of children: None    Years of education: None    Highest education level: None   Occupational History    None   Tobacco Use    Smoking status: Never Smoker    Smokeless tobacco: Never Used   Vaping Use    Vaping Use: Never used   Substance and Sexual Activity    Alcohol use: Never    Drug use: Never    Sexual activity: None   Other Topics Concern    None   Social History Narrative    None     Social Determinants of Health     Financial Resource Strain: Not on file   Food Insecurity: Not on file   Transportation Needs: Not on file   Physical Activity: Not on file   Stress: Not on file   Social Connections: Not on file   Intimate Partner Violence: Not on file   Housing Stability: Not on file         Active Problems:  Patient Active Problem List   Diagnosis    Moderate persistent asthma with (acute) exacerbation         The following portions of the patient's history were reviewed and updated as appropriate: past medical history, past surgical history, past family history,  past social history, current medications, allergies and problem list       Review of Systems:  Constitutional: Denies fever, chills  Eyes: Denies eye redness, eye discharge  ENT: Denies hearing loss, sneezing, nasal discharge, sore throat   Respiratory: Denies cough, expectoration, shortness of breath  Cardiovascular: Denies chest pain, lower extremity swelling  Gastrointestinal: Denies abdominal pain, nausea, vomiting, diarrhea  Genito-Urinary: Denies dysuria, incontinence  Musculoskeletal: Denies back pain, joint pain, muscle pain  Neurologic: Denies lightheadedness, syncope, headache, seizures  Endocrine: Denies polydipsia, temperature intolerance  Allergy and Immunology: Denies hives, insect bite sensitivity  Hematological and Lymphatic: Denies bleeding problems, swollen glands   Psychological: Denies depression, suicidal ideation, anxiety, panic  Dermatological: Denies pruritus, rash, skin lesion changes      Vitals:  Vitals:    08/08/22 1109   BP: 148/80   Pulse: 84   Resp: 16   SpO2: 99%       Body mass index is 24 27 kg/m²  Weight (last 2 days)     Date/Time Weight    08/08/22 1109 62 1 (137)            Physical Examination:  General: Patient is not in acute distress  Awake, alert, oriented in time, place and person  Responding to commands  Head: Normocephalic  Atraumatic  Eyes: Both pupils normal sized, round and reactive to light  Nonicteric  ENT: Normal external ear canals  Neck: Supple  JVP not raised  Trachea central  No thyromegaly  Lungs: Bilateral bronchovascular breath sounds with no crackles or rhonchi  Chest wall: No tenderness  Cardiovascular: RRR  S1 and S2 normal  No murmur, rub or gallop  Gastrointestinal: Abdomen soft, nontender  No guarding or rigidity  Liver and spleen not palpable  Bowel sounds present  Neurologic: Patient is awake, alert, oriented in time, place and person  Responding to commands  Moving all extremities  Integumentary:  No skin rash  Lymphatic: No cervical lymphadenopathy  Back: Symmetric  No CVA tenderness  Extremities: No clubbing, cyanosis or edema      Results for orders placed during the hospital encounter of 01/24/22    NM myocardial perfusion spect (rx stress and/or rest)    Interpretation Summary    Stress ECG: The stress ECG is nondiagnostic for ischemia after pharmacologic stress    Perfusion: There is a left ventricular perfusion defect that is small in size present in the inferoseptal location(s) that is paradoxical     Stress Function: Left ventricular function post-stress is normal  Post-stress ejection fraction is 65 %  Normal study after pharmacologic vasodilation  There was image artifact without diagnostic evidence of ischemia  (small paradoxical basal inferoseptal defect)  Left ventricular function was preserved        Medications:    Current Outpatient Medications:     atorvastatin (LIPITOR) 10 mg tablet, , Disp: , Rfl:     Cholecalciferol 25 MCG (1000 UT) tablet, Take 1,000 Units by mouth daily, Disp: , Rfl:     fexofenadine (ALLEGRA) 180 MG tablet, Take 180 mg by mouth daily, Disp: , Rfl:     fluticasone-vilanterol (Breo Ellipta) 200-25 MCG/INH inhaler, Breo Ellipta 200 mcg-25 mcg/dose powder for inhalation, Disp: , Rfl:     lisinopril (ZESTRIL) 5 mg tablet, Take 1 tablet (5 mg total) by mouth daily (Patient taking differently: Take 10 mg by mouth daily), Disp: 90 tablet, Rfl: 3    Multiple Vitamins-Minerals (CENTRUM SILVER 50+MEN PO), Take 1 tablet by mouth daily, Disp: , Rfl:     PARoxetine (PAXIL) 20 mg tablet, paroxetine 20 mg tablet, Disp: , Rfl:     Spiriva Respimat 1 25 MCG/ACT AERS inhaler, , Disp: , Rfl:     SUMAtriptan (IMITREX) 50 mg tablet, sumatriptan 50 mg tablet, Disp: , Rfl:       Allergies: Allergies   Allergen Reactions    Albumen, Egg - Food Allergy Shortness Of Breath    Penicillins Rash     Other reaction(s): Asthma      Tetanus Antitoxin Anaphylaxis         Assessment and Plan:  1  Intermittent palpitations  Intolerant to beta-blocker  2  Essential hypertension  BP not at goal   Increase lisinopril from 10 mg to 15 mg daily  Monitor BP at home and call if abnormal    3  LVH (left ventricular hypertrophy), Diastolic dysfunction  Tight BP control    4  Mixed hyperlipidemia  Continue statin and diet control  Her PCP closely monitors her blood work    Recommend aggressive risk factor modification and therapeutic lifestyle changes  Low-salt, low-calorie, low-fat, low-cholesterol diet with regular exercise and to optimize weight  I will defer the ordering and monitoring of necessity lab studies to you, but I am available and happy to review and manage any of the data at your request in the future  Discussed concepts of atherosclerosis, including signs and symptoms of cardiac disease  Previous studies were reviewed  Safety measures were reviewed  Questions were entertained and answered  Patient was advised to report any problems requiring medical attention  Follow-up with PCP and appropriate specialist and lab work as discussed  Return for follow up visit as scheduled or earlier, if needed  Thank you for allowing me to participate in the care and evaluation of your patient  Should you have any questions, please feel free to contact me        Guerline Tabor MD  8/8/2022,11:26 AM

## 2022-08-09 RX ORDER — LISINOPRIL 10 MG/1
TABLET ORAL
Qty: 135 TABLET | Refills: 2 | Status: SHIPPED | OUTPATIENT
Start: 2022-08-09 | End: 2022-08-22

## 2022-08-09 NOTE — TELEPHONE ENCOUNTER
Name from pharmacy: LISINOPRIL 10 MG TABLET          Will file in chart as: lisinopril (ZESTRIL) 10 mg tablet    Sig: TAKE 1 AND 1/2 TABLETS BY MOUTH DAILY    Disp:  135 tablet    Refills:  2    Start: 8/8/2022    Class: Normal    For: Essential hypertension    Last ordered: Yesterday by Harini Hawkins MD Last refill: 8/8/2022    Rx #: [id number]    Pharmacy comment: Alternative Requested:PLAN WILL ONLY PAY FOR ONE TABLET DAILY  Powered by [employer] by Issue08/08/2022 04:31 PM  This is a duplicate request of medication ordered 2022-08-08  Check to see if the patient is requesting a refill from a new pharmacy  Cardiovascular:  ACE Inhibitors Failed 08/08/2022 04:31 PM   Protocol Details  Cr in normal range and within 360 days    K in normal range and within 360 days    BP completed in the last 6 months    Valid encounter within last 6 months    Pregnancy status within 30 days      This request has changes from the previous prescription  To be filled at: [facility]/pharmacy #[id number]- [facility] [place] - [address] [place]   Please refill if possible  Thanks! No

## 2022-08-21 DIAGNOSIS — I10 ESSENTIAL HYPERTENSION: ICD-10-CM

## 2022-08-22 ENCOUNTER — TELEPHONE (OUTPATIENT)
Dept: CARDIOLOGY CLINIC | Facility: CLINIC | Age: 67
End: 2022-08-22

## 2022-08-22 DIAGNOSIS — I10 HYPERTENSION, UNSPECIFIED TYPE: Primary | ICD-10-CM

## 2022-08-22 RX ORDER — LISINOPRIL 10 MG/1
TABLET ORAL
Qty: 135 TABLET | Refills: 2 | Status: SHIPPED | OUTPATIENT
Start: 2022-08-22 | End: 2022-08-22 | Stop reason: DRUGHIGH

## 2022-08-22 RX ORDER — LISINOPRIL 10 MG/1
10 TABLET ORAL DAILY
Qty: 90 TABLET | Refills: 3 | Status: SHIPPED | OUTPATIENT
Start: 2022-08-22

## 2022-08-22 RX ORDER — LISINOPRIL 5 MG/1
5 TABLET ORAL DAILY
Qty: 90 TABLET | Refills: 3 | Status: SHIPPED | OUTPATIENT
Start: 2022-08-22

## 2022-08-22 NOTE — TELEPHONE ENCOUNTER
Oliver Agustin from [facility] [phone] contacting office requesting 2 new prescriptions be sent due to patient's insurance coverage>    One for 10 mg 1 x daily   One for 5 mg 1 x daily

## 2022-08-23 ENCOUNTER — TELEPHONE (OUTPATIENT)
Dept: CARDIOLOGY CLINIC | Facility: CLINIC | Age: 67
End: 2022-08-23

## 2022-08-23 NOTE — TELEPHONE ENCOUNTER
----- Message from Mohamudmeryl Chandler sent at 8/23/2022  1:05 PM EDT -----  Regarding: Heart pain  Dear Dr Juani Costa,  On August 22, I felt very weak physically  In the morning, my blood pressure was 128/98, pulse rate 98  I did not take Lisinopril as my upper number was too low (128)  In 2 hours, I had blood pressure 162/68, pulse 87 and started feeling heart pain and I took Lisinopril 10mg  In the evening, my blood pressure was 130/85, pulse 90 and today, in the morning, I had blood pressure 121/102, pulse 88 and my monitor showed irregular heartbeat symbol  At that time, I took no medicine  At the moment my pressure is 128/98, pulse 90  Please advise: (a) should I take Lisinopril in such situations; and (b) May I have problems with big blood vessels? I apologize for bothering you but this worries me a lot  Best Regards   [employer] Geraldine

## 2023-09-09 DIAGNOSIS — I10 HYPERTENSION, UNSPECIFIED TYPE: ICD-10-CM

## 2023-09-11 RX ORDER — LISINOPRIL 5 MG/1
5 TABLET ORAL DAILY
Qty: 30 TABLET | Refills: 0 | Status: SHIPPED | OUTPATIENT
Start: 2023-09-11

## 2023-09-11 RX ORDER — LISINOPRIL 5 MG/1
5 TABLET ORAL DAILY
Qty: 90 TABLET | Refills: 1 | OUTPATIENT
Start: 2023-09-11

## 2023-09-11 NOTE — TELEPHONE ENCOUNTER
Name from pharmacy: LISINOPRIL 5 MG TABLET         Will file in chart as: lisinopril (ZESTRIL) 5 mg tablet    Sig: TAKE 1 TABLET (5 MG TOTAL) BY MOUTH DAILY. Disp:  90 tablet    Refills:  1    Start: 9/9/2023    Class: Normal    Non-formulary For: Hypertension, unspecified type    Last ordered: 2 months ago (6/23/2023) by Renetta Freedman MD    Last refill: 8/16/2023    Rx #: [id number]    Pharmacy comment: REQUEST FOR 90 DAYS PRESCRIPTION. DX Code Needed. Cardiovascular:  ACE Inhibitors Failed 09/09/2023 02:30 PM   Protocol Details  BP completed in the last 6 months    K is 5.3 or below and within 360 days    eGFR is 60 or above and within 360 days    Valid encounter within last 6 months      This request has changes from the previous prescription. To be filled at: [facility]/pharmacy [id number] - [address] [place] [address]   Please review and refill if possible. Message has been sent to clerical to schedule pt for a follow up appt. Thanks!

## 2023-10-17 ENCOUNTER — HOSPITAL ENCOUNTER (OUTPATIENT)
Dept: ULTRASOUND IMAGING | Facility: CLINIC | Age: 68
Discharge: HOME | End: 2023-10-17
Payer: MEDICARE

## 2023-10-17 DIAGNOSIS — E04.2 MULTIPLE THYROID NODULES: ICD-10-CM

## 2023-10-17 PROCEDURE — 76536 US EXAM OF HEAD AND NECK: CPT

## 2024-08-13 ENCOUNTER — HOSPITAL ENCOUNTER (EMERGENCY)
Facility: HOSPITAL | Age: 69
Discharge: HOME | End: 2024-08-13
Attending: STUDENT IN AN ORGANIZED HEALTH CARE EDUCATION/TRAINING PROGRAM
Payer: MEDICARE

## 2024-08-13 ENCOUNTER — APPOINTMENT (EMERGENCY)
Dept: RADIOLOGY | Facility: HOSPITAL | Age: 69
End: 2024-08-13
Payer: MEDICARE

## 2024-08-13 VITALS
OXYGEN SATURATION: 98 % | TEMPERATURE: 98.2 F | WEIGHT: 142.64 LBS | RESPIRATION RATE: 17 BRPM | BODY MASS INDEX: 25.27 KG/M2 | SYSTOLIC BLOOD PRESSURE: 135 MMHG | HEART RATE: 71 BPM | DIASTOLIC BLOOD PRESSURE: 74 MMHG

## 2024-08-13 DIAGNOSIS — R07.9 CHEST PAIN: Primary | ICD-10-CM

## 2024-08-13 LAB
2HR DELTA HS TROPONIN: 0 NG/L
ALBUMIN SERPL BCG-MCNC: 3.7 G/DL (ref 3.5–5)
ALP SERPL-CCNC: 76 U/L (ref 34–104)
ALT SERPL W P-5'-P-CCNC: 14 U/L (ref 7–52)
ANION GAP SERPL CALCULATED.3IONS-SCNC: 6 MMOL/L (ref 4–13)
AST SERPL W P-5'-P-CCNC: 14 U/L (ref 13–39)
ATRIAL RATE: 80 BPM
BASOPHILS # BLD AUTO: 0.05 THOUSANDS/ÂΜL (ref 0–0.1)
BASOPHILS NFR BLD AUTO: 1 % (ref 0–1)
BILIRUB SERPL-MCNC: 0.45 MG/DL (ref 0.2–1)
BNP SERPL-MCNC: 14 PG/ML (ref 0–100)
BUN SERPL-MCNC: 15 MG/DL (ref 5–25)
CALCIUM SERPL-MCNC: 9.5 MG/DL (ref 8.4–10.2)
CARDIAC TROPONIN I PNL SERPL HS: 3 NG/L
CARDIAC TROPONIN I PNL SERPL HS: 3 NG/L
CHLORIDE SERPL-SCNC: 105 MMOL/L (ref 96–108)
CO2 SERPL-SCNC: 30 MMOL/L (ref 21–32)
CREAT SERPL-MCNC: 0.93 MG/DL (ref 0.6–1.3)
EOSINOPHIL # BLD AUTO: 0.1 THOUSAND/ÂΜL (ref 0–0.61)
EOSINOPHIL NFR BLD AUTO: 2 % (ref 0–6)
ERYTHROCYTE [DISTWIDTH] IN BLOOD BY AUTOMATED COUNT: 13.6 % (ref 11.6–15.1)
FLUAV RNA RESP QL NAA+PROBE: NEGATIVE
FLUBV RNA RESP QL NAA+PROBE: NEGATIVE
GFR SERPL CREATININE-BSD FRML MDRD: 62 ML/MIN/1.73SQ M
GLUCOSE SERPL-MCNC: 76 MG/DL (ref 65–140)
HCT VFR BLD AUTO: 41.8 % (ref 34.8–46.1)
HGB BLD-MCNC: 14.1 G/DL (ref 11.5–15.4)
IMM GRANULOCYTES # BLD AUTO: 0.02 THOUSAND/UL (ref 0–0.2)
IMM GRANULOCYTES NFR BLD AUTO: 0 % (ref 0–2)
LYMPHOCYTES # BLD AUTO: 1.84 THOUSANDS/ÂΜL (ref 0.6–4.47)
LYMPHOCYTES NFR BLD AUTO: 29 % (ref 14–44)
MAGNESIUM SERPL-MCNC: 1.9 MG/DL (ref 1.9–2.7)
MCH RBC QN AUTO: 30.3 PG (ref 26.8–34.3)
MCHC RBC AUTO-ENTMCNC: 33.7 G/DL (ref 31.4–37.4)
MCV RBC AUTO: 90 FL (ref 82–98)
MONOCYTES # BLD AUTO: 0.54 THOUSAND/ÂΜL (ref 0.17–1.22)
MONOCYTES NFR BLD AUTO: 9 % (ref 4–12)
NEUTROPHILS # BLD AUTO: 3.81 THOUSANDS/ÂΜL (ref 1.85–7.62)
NEUTS SEG NFR BLD AUTO: 59 % (ref 43–75)
NRBC BLD AUTO-RTO: 0 /100 WBCS
P AXIS: 71 DEGREES
PLATELET # BLD AUTO: 272 THOUSANDS/UL (ref 149–390)
PMV BLD AUTO: 10.1 FL (ref 8.9–12.7)
POTASSIUM SERPL-SCNC: 3.7 MMOL/L (ref 3.5–5.3)
PR INTERVAL: 162 MS
PROT SERPL-MCNC: 6.2 G/DL (ref 6.4–8.4)
QRS AXIS: -41 DEGREES
QRSD INTERVAL: 88 MS
QT INTERVAL: 370 MS
QTC INTERVAL: 426 MS
RBC # BLD AUTO: 4.65 MILLION/UL (ref 3.81–5.12)
RSV RNA RESP QL NAA+PROBE: NEGATIVE
SARS-COV-2 RNA RESP QL NAA+PROBE: NEGATIVE
SODIUM SERPL-SCNC: 141 MMOL/L (ref 135–147)
T WAVE AXIS: 58 DEGREES
TSH SERPL DL<=0.05 MIU/L-ACNC: 0.82 UIU/ML (ref 0.45–4.5)
VENTRICULAR RATE: 80 BPM
WBC # BLD AUTO: 6.36 THOUSAND/UL (ref 4.31–10.16)

## 2024-08-13 PROCEDURE — 93010 ELECTROCARDIOGRAM REPORT: CPT | Performed by: INTERNAL MEDICINE

## 2024-08-13 PROCEDURE — 84443 ASSAY THYROID STIM HORMONE: CPT | Performed by: STUDENT IN AN ORGANIZED HEALTH CARE EDUCATION/TRAINING PROGRAM

## 2024-08-13 PROCEDURE — 71046 X-RAY EXAM CHEST 2 VIEWS: CPT

## 2024-08-13 PROCEDURE — 36415 COLL VENOUS BLD VENIPUNCTURE: CPT | Performed by: STUDENT IN AN ORGANIZED HEALTH CARE EDUCATION/TRAINING PROGRAM

## 2024-08-13 PROCEDURE — 83735 ASSAY OF MAGNESIUM: CPT | Performed by: STUDENT IN AN ORGANIZED HEALTH CARE EDUCATION/TRAINING PROGRAM

## 2024-08-13 PROCEDURE — 85025 COMPLETE CBC W/AUTO DIFF WBC: CPT | Performed by: STUDENT IN AN ORGANIZED HEALTH CARE EDUCATION/TRAINING PROGRAM

## 2024-08-13 PROCEDURE — 84484 ASSAY OF TROPONIN QUANT: CPT | Performed by: STUDENT IN AN ORGANIZED HEALTH CARE EDUCATION/TRAINING PROGRAM

## 2024-08-13 PROCEDURE — 0241U HB NFCT DS VIR RESP RNA 4 TRGT: CPT | Performed by: STUDENT IN AN ORGANIZED HEALTH CARE EDUCATION/TRAINING PROGRAM

## 2024-08-13 PROCEDURE — 99285 EMERGENCY DEPT VISIT HI MDM: CPT

## 2024-08-13 PROCEDURE — 83880 ASSAY OF NATRIURETIC PEPTIDE: CPT | Performed by: STUDENT IN AN ORGANIZED HEALTH CARE EDUCATION/TRAINING PROGRAM

## 2024-08-13 PROCEDURE — 93005 ELECTROCARDIOGRAM TRACING: CPT

## 2024-08-13 PROCEDURE — 80053 COMPREHEN METABOLIC PANEL: CPT | Performed by: STUDENT IN AN ORGANIZED HEALTH CARE EDUCATION/TRAINING PROGRAM

## 2024-08-13 PROCEDURE — 99284 EMERGENCY DEPT VISIT MOD MDM: CPT | Performed by: STUDENT IN AN ORGANIZED HEALTH CARE EDUCATION/TRAINING PROGRAM

## 2024-08-13 RX ORDER — ASPIRIN 325 MG
325 TABLET ORAL ONCE
Status: COMPLETED | OUTPATIENT
Start: 2024-08-13 | End: 2024-08-13

## 2024-08-13 RX ADMIN — ASPIRIN 325 MG ORAL TABLET 325 MG: 325 PILL ORAL at 12:50

## 2024-08-13 NOTE — DISCHARGE INSTRUCTIONS
Continues to use over-the-counter medication as needed for discomfort.  Continue stay well-hydrated.    Follow-up with your primary care physician for reevaluation.    Return for any new or worsening symptoms.   Critical Care Gastroenterology Gastroenterology Gastroenterology Gastroenterology Gastroenterology Gastroenterology Gastroenterology Hospitalist Internal Medicine Internal Medicine Internal Medicine Internal Medicine Internal Medicine Internal Medicine Internal Medicine Hospitalist Internal Medicine Internal Medicine

## 2024-08-13 NOTE — ED PROVIDER NOTES
"History  Chief Complaint   Patient presents with    Chest Pain     Patient co left sided chest pain that radiates into her back and left arm as well as SOB that started this morning. Patient reports \"I have had fatigue all week and my monitor was showing Afib.\"     HPI    Prior to Admission Medications   Prescriptions Last Dose Informant Patient Reported? Taking?   Cholecalciferol 25 MCG (1000 UT) tablet  Self Yes No   Sig: Take 1,000 Units by mouth daily   Multiple Vitamins-Minerals (CENTRUM SILVER 50+MEN PO)  Self Yes No   Sig: Take 1 tablet by mouth daily   PARoxetine (PAXIL) 20 mg tablet  Self Yes No   Sig: paroxetine 20 mg tablet   SUMAtriptan (IMITREX) 50 mg tablet  Self Yes No   Sig: sumatriptan 50 mg tablet   Spiriva Respimat 1.25 MCG/ACT AERS inhaler  Self Yes No   atorvastatin (LIPITOR) 10 mg tablet  Self Yes No   fexofenadine (ALLEGRA) 180 MG tablet  Self Yes No   Sig: Take 180 mg by mouth daily   fluticasone-vilanterol (Breo Ellipta) 200-25 MCG/INH inhaler  Self Yes No   Sig: Breo Ellipta 200 mcg-25 mcg/dose powder for inhalation   lisinopril (ZESTRIL) 10 mg tablet   No No   Sig: Take 1 tablet (10 mg total) by mouth daily   lisinopril (ZESTRIL) 5 mg tablet   No No   Sig: Take 1 tablet (5 mg total) by mouth daily      Facility-Administered Medications: None       Past Medical History:   Diagnosis Date    Asthma        History reviewed. No pertinent surgical history.    Family History   Problem Relation Age of Onset    Hypertension Mother      I have reviewed and agree with the history as documented.    E-Cigarette/Vaping    E-Cigarette Use Never User      E-Cigarette/Vaping Substances    Nicotine No     THC No     CBD No      Social History     Tobacco Use    Smoking status: Never    Smokeless tobacco: Never   Vaping Use    Vaping status: Never Used   Substance Use Topics    Alcohol use: Never    Drug use: Never       Review of Systems    Physical Exam  Physical Exam    Vital Signs  ED Triage Vitals " [08/13/24 1229]   Temperature Pulse Respirations Blood Pressure SpO2   98.2 °F (36.8 °C) 74 18 139/65 98 %      Temp Source Heart Rate Source Patient Position - Orthostatic VS BP Location FiO2 (%)   Temporal Monitor Sitting Left arm --      Pain Score       --           Vitals:    08/13/24 1430 08/13/24 1500 08/13/24 1530 08/13/24 1600   BP: 145/65 144/67 141/71 135/74   Pulse: 69 68 69 71   Patient Position - Orthostatic VS:             Visual Acuity      ED Medications  Medications   aspirin tablet 325 mg (325 mg Oral Given 8/13/24 1250)       Diagnostic Studies  Results Reviewed       Procedure Component Value Units Date/Time    HS Troponin I 2hr [[id number]]  (Normal) Collected: 08/13/24 1503    Lab Status: Final result Specimen: Blood from Arm, Right Updated: 08/13/24 1537     hs TnI 2hr 3 ng/L      Delta 2hr hsTnI 0 ng/L     TSH, 3rd generation with Free T4 reflex [[id number]]  (Normal) Collected: 08/13/24 1303    Lab Status: Final result Specimen: Blood from Arm, Right Updated: 08/13/24 1346     TSH 3RD GENERATON 0.818 uIU/mL     HS Troponin 0hr (reflex protocol) [[id number]]  (Normal) Collected: 08/13/24 1303    Lab Status: Final result Specimen: Blood from Arm, Right Updated: 08/13/24 1335     hs TnI 0hr 3 ng/L     B-Type Natriuretic Peptide(BNP) [[id number]]  (Normal) Collected: 08/13/24 1303    Lab Status: Final result Specimen: Blood from Arm, Right Updated: 08/13/24 1335     BNP 14 pg/mL     FLU/RSV/COVID - if FLU/RSV clinically relevant [[id number]]  (Normal) Collected: 08/13/24 1251    Lab Status: Final result Specimen: Nares from Nose Updated: 08/13/24 1334     SARS-CoV-2 Negative     INFLUENZA A PCR Negative     INFLUENZA B PCR Negative     RSV PCR Negative    Narrative:      This test has been performed using the CoV-2/Flu/RSV plus assay on the [employer] GeneXpert platform. This test has been validated by the  and verified by the performing laboratory.     This test is designed to amplify and  detect the following: nucleocapsid (N), envelope (E), and RNA-dependent RNA polymerase (RdRP) genes of the SARS-CoV-2 genome; matrix (M), basic polymerase (PB2), and acidic protein (PA) segments of the influenza A genome; matrix (M) and non-structural protein (NS) segments of the influenza B genome, and the nucleocapsid genes of RSV A and RSV B.     Positive results are indicative of the presence of Flu A, Flu B, RSV, and/or SARS-CoV-2 RNA. Positive results for SARS-CoV-2 or suspected novel influenza should be reported to state, local, or federal health departments according to local reporting requirements.      All results should be assessed in conjunction with clinical presentation and other laboratory markers for clinical management.     FOR PEDIATRIC PATIENTS - copy/paste COVID Guidelines URL to browser: [email or web address]       Comprehensive metabolic panel [[id number]]  (Abnormal) Collected: 08/13/24 1303    Lab Status: Final result Specimen: Blood from Arm, Right Updated: 08/13/24 1331     Sodium 141 mmol/L      Potassium 3.7 mmol/L      Chloride 105 mmol/L      CO2 30 mmol/L      ANION GAP 6 mmol/L      BUN 15 mg/dL      Creatinine 0.93 mg/dL      Glucose 76 mg/dL      Calcium 9.5 mg/dL      AST 14 U/L      ALT 14 U/L      Alkaline Phosphatase 76 U/L      Total Protein 6.2 g/dL      Albumin 3.7 g/dL      Total Bilirubin 0.45 mg/dL      eGFR 62 ml/min/1.73sq m     Narrative:      National Kid[employer] guidelines for Chronic Kidney Disease (CKD):     Stage 1 with normal or high GFR (GFR > 90 mL/min/1.73 square meters)    Stage 2 Mild CKD (GFR = 60-89 mL/min/1.73 square meters)    Stage 3A Moderate CKD (GFR = 45-59 mL/min/1.73 square meters)    Stage 3B Moderate CKD (GFR = 30-44 mL/min/1.73 square meters)    Stage 4 Severe CKD (GFR = 15-29 mL/min/1.73 square meters)    Stage 5 End Stage CKD (GFR <15 mL/min/1.73 square meters)  Note: GFR  calculation is accurate only with a steady state creatinine    Magnesium [[id number]]  (Normal) Collected: 08/13/24 1303    Lab Status: Final result Specimen: Blood from Arm, Right Updated: 08/13/24 1331     Magnesium 1.9 mg/dL     CBC and differential [[id number]] Collected: 08/13/24 1303    Lab Status: Final result Specimen: Blood from Arm, Right Updated: 08/13/24 1320     WBC 6.36 Thousand/uL      RBC 4.65 Million/uL      Hemoglobin 14.1 g/dL      Hematocrit 41.8 %      MCV 90 fL      MCH 30.3 pg      MCHC 33.7 g/dL      RDW 13.6 %      MPV 10.1 fL      Platelets 272 Thousands/uL      nRBC 0 /100 WBCs      Segmented % 59 %      Immature Grans % 0 %      Lymphocytes % 29 %      Monocytes % 9 %      Eosinophils Relative 2 %      Basophils Relative 1 %      Absolute Neutrophils 3.81 Thousands/µL      Absolute Immature Grans 0.02 Thousand/uL      Absolute Lymphocytes 1.84 Thousands/µL      Absolute Monocytes 0.54 Thousand/µL      Eosinophils Absolute 0.10 Thousand/µL      Basophils Absolute 0.05 Thousands/µL                    XR chest 2 views   Final Result by Brenden Almonte MD (08/13 1521)      No acute cardiopulmonary disease.            Resident: Aldo Varma I, the attending radiologist, have reviewed the images and agree with the final report above.      Workstation performed: [id number]                    Procedures  Procedures         ED Course               HEART Risk Score      Flowsheet Row Most Recent Value   Heart Score Risk Calculator    History 0 Filed at: 08/13/2024 1601   ECG 0 Filed at: 08/13/2024 1601   Age 2 Filed at: 08/13/2024 1601   Risk Factors 1 Filed at: 08/13/2024 1601   Troponin 0 Filed at: 08/13/2024 1601   HEART Score 3 Filed at: 08/13/2024 1601          Identification of Seniors at Risk      Flowsheet Row Most Recent Value   (ISAR) Identification of Seniors at Risk    Before the illness or injury that brought you to the Emergency, did you need someone to help you on a  regular basis? 0 Filed at: 08/13/2024 1231   In the last 24 hours, have you needed more help than usual? 0 Filed at: 08/13/2024 1231   Have you been hospitalized for one or more nights during the past 6 months? 0 Filed at: 08/13/2024 1231   In general, do you see well? 0 Filed at: 08/13/2024 1231   In general, do you have serious problems with your memory? 0 Filed at: 08/13/2024 1231   Do you take more than three different medications every day? 1 Filed at: 08/13/2024 1231   ISAR Score 1 Filed at: 08/13/2024 1231                        SBIRT 22yo+      Flowsheet Row Most Recent Value   Initial Alcohol Screen:  AUDIT-C     1. How often do you have a drink containing alcohol? 0 Filed at: 08/13/2024 1231   2. How many drinks containing alcohol do you have on a typical day you are drinking?  0 Filed at: 08/13/2024 1231   3b. FEMALE Any Age, or MALE 65+: How often do you have 4 or more drinks on one occassion? 0 Filed at: 08/13/2024 1231   Audit-C Score 0 Filed at: 08/13/2024 1231   TWILA: How many times in the past year have you...    Used an illegal drug or used a prescription medication for non-medical reasons? Never Filed at: 08/13/2024 1231                      Medical Decision Making  EKG: rate 80, NSR without signs of acute ischemic change.    Amount and/or Complexity of Data Reviewed  Labs: ordered.  Radiology: ordered.    Risk  OTC drugs.                 Disposition  Final diagnoses:   Chest pain     Time reflects when diagnosis was documented in both MDM as applicable and the Disposition within this note       Time User Action Codes Description Comment    8/13/2024  4:01 PM Lauryn Overton Add [R07.9] Chest pain           ED Disposition       ED Disposition   Discharge    Condition   Stable    Date/Time   Tue Aug 13, 2024 1601    Comment   Ana Paula Oconnor discharge to home/self care.                   Follow-up Information       Follow up With Specialties Details Why Contact Info    Olu Adler MD Family  Medicine   [address] [address]  [phone]              Discharge Medication List as of 8/13/2024  4:02 PM        CONTINUE these medications which have NOT CHANGED    Details   atorvastatin (LIPITOR) 10 mg tablet Starting Sat 10/30/2021, Historical Med      Cholecalciferol 25 MCG (1000 UT) tablet Take 1,000 Units by mouth daily, Historical Med      fexofenadine (ALLEGRA) 180 MG tablet Take 180 mg by mouth daily, Historical Med      fluticasone-vilanterol (Breo Ellipta) 200-25 MCG/INH inhaler Breo Ellipta 200 mcg-25 mcg/dose powder for inhalation, Historical Med      !! lisinopril (ZESTRIL) 10 mg tablet Take 1 tablet (10 mg total) by mouth daily, Starting Mon 8/22/2022, Normal      !! lisinopril (ZESTRIL) 5 mg tablet Take 1 tablet (5 mg total) by mouth daily, Starting Thu 10/5/2023, Normal      Multiple Vitamins-Minerals (CENTRUM SILVER 50+MEN PO) Take 1 tablet by mouth daily, Historical Med      PARoxetine (PAXIL) 20 mg tablet paroxetine 20 mg tablet, Historical Med      Spiriva Respimat 1.25 MCG/ACT AERS inhaler Starting Tue 12/21/2021, Historical Med      SUMAtriptan (IMITREX) 50 mg tablet sumatriptan 50 mg tablet, Historical Med       !! - Potential duplicate medications found. Please discuss with provider.              PDMP Review       None            ED Provider  Electronically Signed by           Time User Action Codes Description Comment    8/13/2024  4:01 PM Lauryn Overton [R07.9] Chest pain           ED Disposition       ED Disposition   Discharge    Condition   Stable    Date/Time   Tue Aug 13, 2024 1601    Comment   Ana Paula Ayoubsteven discharge to home/self care.                   Follow-up Information       Follow up With Specialties Details Why Contact Info    Olu Adler MD Family Medicine   [address] [place] [id number] [phone]              Discharge Medication List as of 8/13/2024  4:02 PM        CONTINUE these medications which have NOT CHANGED    Details   atorvastatin (LIPITOR) 10 mg tablet Starting Sat 10/30/2021, Historical Med      Cholecalciferol 25 MCG (1000 UT) tablet Take 1,000 Units by mouth daily, Historical Med      fexofenadine (ALLEGRA) 180 MG tablet Take 180 mg by mouth daily, Historical Med      fluticasone-vilanterol (Breo Ellipta) 200-25 MCG/INH inhaler Breo Ellipta 200 mcg-25 mcg/dose powder for inhalation, Historical Med      !! lisinopril (ZESTRIL) 10 mg tablet Take 1 tablet (10 mg total) by mouth daily, Starting Mon 8/22/2022, Normal      !! lisinopril (ZESTRIL) 5 mg tablet Take 1 tablet (5 mg total) by mouth daily, Starting Thu 10/5/2023, Normal      Multiple Vitamins-Minerals (CENTRUM SILVER 50+MEN PO) Take 1 tablet by mouth daily, Historical Med      PARoxetine (PAXIL) 20 mg tablet paroxetine 20 mg tablet, Historical Med      Spiriva Respimat 1.25 MCG/ACT AERS inhaler Starting Tue 12/21/2021, Historical Med      SUMAtriptan (IMITREX) 50 mg tablet sumatriptan 50 mg tablet, Historical Med       !! - Potential duplicate medications found. Please discuss with provider.              PDMP Review       None            ED Provider  Electronically Signed by             Lauryn Overton DO  08/24/24 9119

## 2024-09-08 DIAGNOSIS — I10 HYPERTENSION, UNSPECIFIED TYPE: ICD-10-CM

## 2024-09-12 RX ORDER — LISINOPRIL 10 MG/1
10 TABLET ORAL DAILY
Qty: 90 TABLET | Refills: 3 | Status: SHIPPED | OUTPATIENT
Start: 2024-09-12

## 2024-11-01 ENCOUNTER — OFFICE VISIT (OUTPATIENT)
Dept: CARDIOLOGY CLINIC | Facility: CLINIC | Age: 69
End: 2024-11-01
Payer: MEDICARE

## 2024-11-01 VITALS
OXYGEN SATURATION: 99 % | HEIGHT: 63 IN | WEIGHT: 145 LBS | SYSTOLIC BLOOD PRESSURE: 124 MMHG | DIASTOLIC BLOOD PRESSURE: 84 MMHG | RESPIRATION RATE: 16 BRPM | BODY MASS INDEX: 25.69 KG/M2 | HEART RATE: 88 BPM

## 2024-11-01 DIAGNOSIS — R07.9 CHEST PAIN: Primary | ICD-10-CM

## 2024-11-01 DIAGNOSIS — I70.0 ATHEROSCLEROSIS OF ABDOMINAL AORTA (HCC): ICD-10-CM

## 2024-11-01 PROCEDURE — 99214 OFFICE O/P EST MOD 30 MIN: CPT | Performed by: INTERNAL MEDICINE

## 2024-11-01 NOTE — PROGRESS NOTES
Cardiology follow-up    Ana Paula Oconnor  [id number]  1955  [address] [facility] [place]  [address]  [facility] [address]    Impression:  Chest pain with recent ED evaluation 8/13/24  Dyspnea on exertion  Intermittent palpitations, intolerant to beta-blocker (asthma was worse)   Hypertension  LVH, diastolic dysfunction  Hyperlipidemia  Atherosclerosis of the abdominal aorta.    Plan:  She seems to be doing somewhat better now.  Given her symptoms will repeat echo and nuc stress, last almost 3 years ago.   Lexiscan as she can't run on a treadmill.   BP is well controlled.   She tells me chol is good.   She walks her dog 2-3 hours a day for exercise.   Recommend aggressive risk factor modification and therapeutic lifestyle changes.  Low-salt, low-calorie, low-fat, low-cholesterol diet with regular exercise and to optimize weight.  I will defer the ordering and monitoring of necessity lab studies to you, but I am available and happy to review and manage any of the data at your request in the future.    Discussed concepts of atherosclerosis, including signs and symptoms of cardiac disease.    Previous studies were reviewed.    Safety measures were reviewed.  All questions were entertained and answered.  Patient was advised to report any problems requiring medical attention.    Follow-up with PCP and appropriate specialist and lab work as discussed.    Return for follow up visit as scheduled or earlier, if needed.  Thank you for allowing me to participate in the care and evaluation of your patient.  Should you have any questions, please feel free to contact me.       The patient is a 69-year-old female who comes for cardiology follow-up following an ER visit for chest pain.  She has a history of hypertension and hyperlipidemia.  She presented to the emergency room 8/13/2024 with left-sided chest pain that radiated  into her back and left arm associated with shortness of breath.  She also stated she had fatigue all week and her monitor was showing atrial fibrillation.  Troponins were negative.  TSH normal.  BNP normal.  COVID-negative.  EKG sinus rhythm, LAFB without acute changes.  Chest x-ray no acute cardiopulmonary process.  She was subsequently discharged.    She now feels tired all week. No current chest pains. She does c/o sob, arrieta attributed to asthma.       Cardiac testing:  Results for orders placed during the hospital encounter of 01/24/22     NM myocardial perfusion spect (rx stress and/or rest)   Interpretation Summary    Stress ECG: The stress ECG is nondiagnostic for ischemia after pharmacologic stress.    Perfusion: There is a left ventricular perfusion defect that is small in size present in the inferoseptal location(s) that is paradoxical.    Stress Function: Left ventricular function post-stress is normal. Post-stress ejection fraction is 65 %.  Normal study after pharmacologic vasodilation. There was image artifact without diagnostic evidence of ischemia. (small paradoxical basal inferoseptal defect). Left ventricular function was preserved.    Echocardiogram [facility] 11/29/2021:  Left Ventricle: There is mild hypertrophy. Systolic function is normal   with an ejection fraction of 60-65%. There is grade I (mild) diastolic   dysfunction.       CT Abd [facility] 7/27/22: Vessels: Abdominal aorta normal in caliber, with mild to moderate atherosclerotic changes.       Patient Active Problem List   Diagnosis    Moderate persistent asthma with (acute) exacerbation     Past Medical History:   Diagnosis Date    Asthma      Social History     Socioeconomic History    Marital status: /Civil Union     Spouse name: Not on file    Number of children: Not on file    Years of education: Not on file    Highest education level: Not on file   Occupational History    Not on file   Tobacco Use    Smoking status: Never    Smokeless  tobacco: Never   Vaping Use    Vaping status: Never Used   Substance and Sexual Activity    Alcohol use: Never    Drug use: Never    Sexual activity: Not on file   Other Topics Concern    Not on file   Social History Narrative    Not on file     Social Determinants of Health     Financial Resource Strain: Low Risk  (10/30/2023)    Received from [facility]    Overall Financial Resource Strain (CARDIA)     Difficulty of Paying Living Expenses: Not very hard   Food Insecurity: No Food Insecurity (10/30/2023)    Received from [facility]    Hunger Vital Sign     Worried About Running Out of Food in the Last Year: Never true     Ran Out of Food in the Last Year: Never true   Transportation Needs: No Transportation Needs (10/30/2023)    Received from [facility]    PRAPARE - Transportation     Lack of Transportation (Medical): No     Lack of Transportation (Non-Medical): No   Physical Activity: Not on file   Stress: No Stress Concern Present (10/30/2023)    Received from [facility]    [employer] - Occupational Stress Questionnaire     Feeling of Stress : Not at all   Social Connections: Moderately Isolated (10/30/2023)    Received from [facility]    Social Connection and Isolation Panel [NHANES]     Frequency of Communication with Friends and Family: More than three times a week     Frequency of Social Gatherings with Friends and Family: Once a week     Attends Yazidism Services: Never     Active Member of Clubs or Organizations: No     Attends Club or Organization Meetings: Never     Marital Status:    Intimate Partner Violence: Not At Risk (10/30/2023)    Received from [facility]    Humiliation, Afraid, Rape, and Kick questionnaire     Fear of Current or Ex-Partner: No     Emotionally Abused: No     Physically Abused: No     Sexually Abused: No   Housing Stability: Not on file  "     Family History   Problem Relation Age of Onset    Hypertension Mother      History reviewed. No pertinent surgical history.    Current Outpatient Medications:     atorvastatin (LIPITOR) 10 mg tablet, , Disp: , Rfl:     Cholecalciferol 25 MCG (1000 UT) tablet, Take 1,000 Units by mouth daily, Disp: , Rfl:     fexofenadine (ALLEGRA) 180 MG tablet, Take 180 mg by mouth daily, Disp: , Rfl:     fluticasone-vilanterol (Breo Ellipta) 200-25 MCG/INH inhaler, Breo Ellipta 200 mcg-25 mcg/dose powder for inhalation, Disp: , Rfl:     lisinopril (ZESTRIL) 10 mg tablet, Take 1 tablet (10 mg total) by mouth daily, Disp: 90 tablet, Rfl: 3    PARoxetine (PAXIL) 20 mg tablet, paroxetine 20 mg tablet, Disp: , Rfl:     Spiriva Respimat 1.25 MCG/ACT AERS inhaler, , Disp: , Rfl:     SUMAtriptan (IMITREX) 50 mg tablet, sumatriptan 50 mg tablet, Disp: , Rfl:     Multiple Vitamins-Minerals (CENTRUM SILVER 50+MEN PO), Take 1 tablet by mouth daily (Patient not taking: Reported on 11/1/2024), Disp: , Rfl:   Allergies   Allergen Reactions    Albumen, Egg - Food Allergy Shortness Of Breath    Penicillins Rash     Other reaction(s): Asthma      Tetanus Antitoxin Anaphylaxis     Vitals:    11/01/24 1126   BP: 124/84   BP Location: Left arm   Patient Position: Sitting   Cuff Size: Standard   Pulse: 88   Resp: 16   SpO2: 99%   Weight: 65.8 kg (145 lb)   Height: 5' 3\" (1.6 m)       Labs:  Admission on 08/13/2024, Discharged on 08/13/2024   Component Date Value    Ventricular Rate 08/13/2024 80     Atrial Rate 08/13/2024 80     IL Interval 08/13/2024 162     QRSD Interval 08/13/2024 88     QT Interval 08/13/2024 370     QTC Interval 08/13/2024 426     P Axis 08/13/2024 71     QRS Axis 08/13/2024 -41     T Wave Axis 08/13/2024 58     WBC 08/13/2024 6.36     RBC 08/13/2024 4.65     Hemoglobin 08/13/2024 14.1     Hematocrit 08/13/2024 41.8     MCV 08/13/2024 90     MCH 08/13/2024 30.3     MCHC 08/13/2024 33.7     RDW 08/13/2024 13.6     MPV " 08/13/2024 10.1     Platelets 08/13/2024 272     nRBC 08/13/2024 0     Segmented % 08/13/2024 59     Immature Grans % 08/13/2024 0     Lymphocytes % 08/13/2024 29     Monocytes % 08/13/2024 9     Eosinophils Relative 08/13/2024 2     Basophils Relative 08/13/2024 1     Absolute Neutrophils 08/13/2024 3.81     Absolute Immature Grans 08/13/2024 0.02     Absolute Lymphocytes 08/13/2024 1.84     Absolute Monocytes 08/13/2024 0.54     Eosinophils Absolute 08/13/2024 0.10     Basophils Absolute 08/13/2024 0.05     Sodium 08/13/2024 141     Potassium 08/13/2024 3.7     Chloride 08/13/2024 105     CO2 08/13/2024 30     ANION GAP 08/13/2024 6     BUN 08/13/2024 15     Creatinine 08/13/2024 0.93     Glucose 08/13/2024 76     Calcium 08/13/2024 9.5     AST 08/13/2024 14     ALT 08/13/2024 14     Alkaline Phosphatase 08/13/2024 76     Total Protein 08/13/2024 6.2 (L)     Albumin 08/13/2024 3.7     Total Bilirubin 08/13/2024 0.45     eGFR 08/13/2024 62     hs TnI 0hr 08/13/2024 3     SARS-CoV-2 08/13/2024 Negative     INFLUENZA A PCR 08/13/2024 Negative     INFLUENZA B PCR 08/13/2024 Negative     RSV PCR 08/13/2024 Negative     TSH 3RD GENERATON 08/13/2024 0.818     Magnesium 08/13/2024 1.9     BNP 08/13/2024 14     hs TnI 2hr 08/13/2024 3     Delta 2hr hsTnI 08/13/2024 0      Imaging: US kidney and bladder with pvr    Result Date: 10/29/2024  Narrative: History: 69-year-old with kidney stones Comparison: 3/7/2024 Technique: Limited ultrasound evaluation of the retroperitoneum was performed utilizing grayscale and color Doppler technique to evaluate the kidneys Findings: Right: The right kidney measures 8.7 cm in length. The renal cortex is normal in echogenicity. Mild cortical scarring throughout the right kidney. No renal masses. A few punctate calcifications appear to be vascular in etiology. There is no pelvicaliectasis. Left: The left kidney measures 9.6 cm in length. The renal cortex is normal in echogenicity. Mild  cortical scarring throughout the left kidney. No renal masses. 7 mm simple exophytic cyst in the mid polar region of the left kidney. A few punctate calcifications appear to be vascular in etiology. There is no pelvicaliectasis. Urinary bladder: Limited ultrasound evaluation of the urinary bladder was performed. The urinary bladder is only mildly distended with urine. There is no gross abnormality.     Impression: Impression: 1. Mild scarring throughout both kidneys. 2. A few punctate calcifications in both kidneys appear to be vascular in etiology. 3. No hydronephrosis. Workstation:[id number]    XR abdomen 1 view kub    Result Date: 10/28/2024  Narrative: HISTORY: Kidney stone. COMPARISON: 4/1/2024. FINDINGS and     Impression: IMPRESSION: A supine radiograph was obtained of the abdomen. There is no convincing plain film evidence for a radiopaque urinary tract stone. There is a nonobstructive bowel gas pattern. Surgical clips noted in the right upper quadrant of the abdomen. The visualized bony structures are intact. Workstation:[id number]      Review of Systems:  Review of SystemsNegative except for hpi.     Physical Exam:  Physical Exam  GEN: Alert and oriented x 3, in no acute distress.  Well appearing and well nourished.   HEENT: Sclera anicteric, conjunctivae pink, mucous membranes moist. Oropharynx clear.   NECK: Supple, no carotid bruits, no significant JVD. Trachea midline, no thyromegaly.   HEART: Regular rhythm, normal S1 and S2, no murmurs, clicks, gallops or rubs. PMI nondisplaced, no thrills.   LUNGS: Clear to auscultation bilaterally; no wheezes, rales, or rhonchi. No increased work of breathing or signs of respiratory distress.   ABDOMEN: Soft, nontender, nondistended, normoactive bowel sounds.   EXTREMITIES: Skin warm and well perfused, no clubbing, cyanosis, or edema.  NEURO: No focal findings. Normal speech. Mood and affect normal.   SKIN: Normal without suspicious lesions on exposed skin.        1.  Chest pain  Ambulatory Referral to Cardiology

## 2024-11-08 ENCOUNTER — HOSPITAL ENCOUNTER (OUTPATIENT)
Dept: NON INVASIVE DIAGNOSTICS | Facility: CLINIC | Age: 69
Discharge: HOME | End: 2024-11-08
Payer: MEDICARE

## 2024-11-08 ENCOUNTER — TELEPHONE (OUTPATIENT)
Dept: CARDIOLOGY CLINIC | Facility: CLINIC | Age: 69
End: 2024-11-08

## 2024-11-08 VITALS
DIASTOLIC BLOOD PRESSURE: 86 MMHG | SYSTOLIC BLOOD PRESSURE: 164 MMHG | HEART RATE: 71 BPM | BODY MASS INDEX: 25.69 KG/M2 | OXYGEN SATURATION: 98 % | HEIGHT: 63 IN | WEIGHT: 145 LBS

## 2024-11-08 VITALS
WEIGHT: 145 LBS | BODY MASS INDEX: 25.69 KG/M2 | HEIGHT: 63 IN | HEART RATE: 88 BPM | DIASTOLIC BLOOD PRESSURE: 84 MMHG | SYSTOLIC BLOOD PRESSURE: 124 MMHG

## 2024-11-08 DIAGNOSIS — R07.9 CHEST PAIN: ICD-10-CM

## 2024-11-08 LAB
AORTIC ROOT: 3.2 CM
APICAL FOUR CHAMBER EJECTION FRACTION: 50 %
ASCENDING AORTA: 3 CM
CHEST PAIN STATEMENT: NORMAL
E WAVE DECELERATION TIME: 147 MS
E/A RATIO: 0.72
FRACTIONAL SHORTENING: 28 (ref 28–44)
INTERVENTRICULAR SEPTUM IN DIASTOLE (PARASTERNAL SHORT AXIS VIEW): 1 CM
INTERVENTRICULAR SEPTUM: 1 CM (ref 0.6–1.1)
LAAS-AP2: 13.8 CM2
LAAS-AP4: 12.9 CM2
LEFT ATRIUM SIZE: 2.8 CM
LEFT ATRIUM VOLUME (MOD BIPLANE): 28 ML
LEFT ATRIUM VOLUME INDEX (MOD BIPLANE): 16.6 ML/M2
LEFT INTERNAL DIMENSION IN SYSTOLE: 2.6 CM (ref 2.1–4)
LEFT VENTRICULAR INTERNAL DIMENSION IN DIASTOLE: 3.6 CM (ref 3.5–6)
LEFT VENTRICULAR POSTERIOR WALL IN END DIASTOLE: 0.9 CM
LEFT VENTRICULAR STROKE VOLUME: 29 ML
LVSV (TEICH): 29 ML
MAX DIASTOLIC BP: 86 MMHG
MAX PREDICTED HEART RATE: 150 BPM
MV E'TISSUE VEL-SEP: 9 CM/S
MV PEAK A VEL: 0.86 M/S
MV PEAK E VEL: 62 CM/S
MV STENOSIS PRESSURE HALF TIME: 43 MS
MV VALVE AREA P 1/2 METHOD: 5.12
NUC STRESS DIASTOLIC VOLUME INDEX: 36 ML/M2
NUC STRESS EJECTION FRACTION: 76 %
NUC STRESS SYSTOLIC VOLUME INDEX: 9 ML/M2
PROTOCOL NAME: NORMAL
RATE PRESSURE PRODUCT: NORMAL
REASON FOR TERMINATION: NORMAL
RIGHT ATRIUM AREA SYSTOLE A4C: 10.7 CM2
RIGHT VENTRICLE ID DIMENSION: 2.7 CM
SL CV LEFT ATRIUM LENGTH A2C: 5.2 CM
SL CV LV EF: 65
SL CV PED ECHO LEFT VENTRICLE DIASTOLIC VOLUME (MOD BIPLANE) 2D: 55 ML
SL CV PED ECHO LEFT VENTRICLE SYSTOLIC VOLUME (MOD BIPLANE) 2D: 25 ML
SL CV REST NUCLEAR ISOTOPE DOSE: 8.8 MCI
SL CV STRESS NUCLEAR ISOTOPE DOSE: 25.9 MCI
SL CV STRESS RECOVERY BP: NORMAL MMHG
SL CV STRESS RECOVERY HR: 86 BPM
SL CV STRESS RECOVERY O2 SAT: 99 %
STRESS ANGINA INDEX: 0
STRESS BASELINE BP: NORMAL MMHG
STRESS BASELINE HR: 71 BPM
STRESS O2 SAT REST: 95 %
STRESS PEAK HR: 118 BPM
STRESS POST EXERCISE DUR MIN: 3 MIN
STRESS POST EXERCISE DUR SEC: 0 SEC
STRESS POST O2 SAT PEAK: 98 %
STRESS POST PEAK BP: 168 MMHG
STRESS POST PEAK HR: 118 BPM
STRESS POST PEAK SYSTOLIC BP: 176 MMHG
STRESS/REST PERFUSION RATIO: 1.13
TARGET HR FORMULA: NORMAL
TEST INDICATION: NORMAL
TRICUSPID ANNULAR PLANE SYSTOLIC EXCURSION: 1.8 CM

## 2024-11-08 PROCEDURE — 78452 HT MUSCLE IMAGE SPECT MULT: CPT

## 2024-11-08 PROCEDURE — 93017 CV STRESS TEST TRACING ONLY: CPT

## 2024-11-08 PROCEDURE — 93018 CV STRESS TEST I&R ONLY: CPT | Performed by: INTERNAL MEDICINE

## 2024-11-08 PROCEDURE — A9502 TC99M TETROFOSMIN: HCPCS

## 2024-11-08 PROCEDURE — 93306 TTE W/DOPPLER COMPLETE: CPT

## 2024-11-08 PROCEDURE — 93016 CV STRESS TEST SUPVJ ONLY: CPT | Performed by: INTERNAL MEDICINE

## 2024-11-08 PROCEDURE — 93306 TTE W/DOPPLER COMPLETE: CPT | Performed by: INTERNAL MEDICINE

## 2024-11-08 PROCEDURE — 78452 HT MUSCLE IMAGE SPECT MULT: CPT | Performed by: INTERNAL MEDICINE

## 2024-11-08 RX ORDER — REGADENOSON 0.08 MG/ML
0.4 INJECTION, SOLUTION INTRAVENOUS ONCE
Status: COMPLETED | OUTPATIENT
Start: 2024-11-08 | End: 2024-11-08

## 2024-11-08 RX ADMIN — REGADENOSON 0.4 MG: 0.08 INJECTION, SOLUTION INTRAVENOUS at 11:48

## 2024-11-08 NOTE — TELEPHONE ENCOUNTER
----- Message from Laron Slaughter MD sent at 11/8/2024  2:52 PM EST -----  Please call pt and tell her echo and stress were normal.